# Patient Record
Sex: FEMALE | Race: WHITE | NOT HISPANIC OR LATINO | URBAN - METROPOLITAN AREA
[De-identification: names, ages, dates, MRNs, and addresses within clinical notes are randomized per-mention and may not be internally consistent; named-entity substitution may affect disease eponyms.]

---

## 2020-09-29 VITALS — RESPIRATION RATE: 12 BRPM | WEIGHT: 178 LBS | HEIGHT: 66 IN | TEMPERATURE: 98 F

## 2020-09-30 ENCOUNTER — OFFICE (OUTPATIENT)
Dept: URBAN - METROPOLITAN AREA CLINIC 75 | Facility: CLINIC | Age: 35
End: 2020-09-30

## 2020-09-30 DIAGNOSIS — R10.11 RIGHT UPPER QUADRANT PAIN: ICD-10-CM

## 2020-09-30 PROCEDURE — 99204 OFFICE O/P NEW MOD 45 MIN: CPT | Performed by: INTERNAL MEDICINE

## 2020-09-30 RX ORDER — HYOSCYAMINE SULFATE 0.12 MG/1
TABLET ORAL
Qty: 60 | Refills: 10 | Status: COMPLETED
Start: 2020-09-30 | End: 2020-11-30

## 2020-11-24 VITALS
SYSTOLIC BLOOD PRESSURE: 145 MMHG | TEMPERATURE: 97 F | OXYGEN SATURATION: 99 % | SYSTOLIC BLOOD PRESSURE: 151 MMHG | SYSTOLIC BLOOD PRESSURE: 125 MMHG | SYSTOLIC BLOOD PRESSURE: 160 MMHG | DIASTOLIC BLOOD PRESSURE: 91 MMHG | HEART RATE: 100 BPM | HEART RATE: 101 BPM | DIASTOLIC BLOOD PRESSURE: 65 MMHG | HEART RATE: 112 BPM | HEART RATE: 92 BPM | HEART RATE: 98 BPM | DIASTOLIC BLOOD PRESSURE: 63 MMHG | RESPIRATION RATE: 14 BRPM | DIASTOLIC BLOOD PRESSURE: 100 MMHG | SYSTOLIC BLOOD PRESSURE: 165 MMHG | DIASTOLIC BLOOD PRESSURE: 79 MMHG | RESPIRATION RATE: 16 BRPM | HEART RATE: 104 BPM | RESPIRATION RATE: 15 BRPM | OXYGEN SATURATION: 98 % | HEART RATE: 96 BPM | DIASTOLIC BLOOD PRESSURE: 86 MMHG | DIASTOLIC BLOOD PRESSURE: 85 MMHG | TEMPERATURE: 97.4 F | RESPIRATION RATE: 9 BRPM | DIASTOLIC BLOOD PRESSURE: 98 MMHG | RESPIRATION RATE: 22 BRPM | OXYGEN SATURATION: 100 % | HEART RATE: 95 BPM | WEIGHT: 175 LBS | SYSTOLIC BLOOD PRESSURE: 166 MMHG | RESPIRATION RATE: 10 BRPM | HEART RATE: 93 BPM | SYSTOLIC BLOOD PRESSURE: 150 MMHG | SYSTOLIC BLOOD PRESSURE: 136 MMHG | DIASTOLIC BLOOD PRESSURE: 83 MMHG | SYSTOLIC BLOOD PRESSURE: 124 MMHG | SYSTOLIC BLOOD PRESSURE: 143 MMHG | HEART RATE: 97 BPM | DIASTOLIC BLOOD PRESSURE: 69 MMHG | HEIGHT: 66 IN

## 2020-11-30 ENCOUNTER — OFFICE (OUTPATIENT)
Dept: URBAN - METROPOLITAN AREA PATHOLOGY 4 | Facility: PATHOLOGY | Age: 35
End: 2020-11-30
Payer: COMMERCIAL

## 2020-11-30 ENCOUNTER — AMBULATORY SURGICAL CENTER (OUTPATIENT)
Dept: URBAN - METROPOLITAN AREA SURGERY 17 | Facility: SURGERY | Age: 35
End: 2020-11-30
Payer: COMMERCIAL

## 2020-11-30 DIAGNOSIS — R10.11 RIGHT UPPER QUADRANT PAIN: ICD-10-CM

## 2020-11-30 DIAGNOSIS — K31.89 OTHER DISEASES OF STOMACH AND DUODENUM: ICD-10-CM

## 2020-11-30 LAB
GI HISTOLOGY: A. SELECT: (no result)
GI HISTOLOGY: B. SELECT: (no result)
GI HISTOLOGY: PDF REPORT: (no result)

## 2020-11-30 PROCEDURE — 88305 TISSUE EXAM BY PATHOLOGIST: CPT | Performed by: INTERNAL MEDICINE

## 2020-11-30 PROCEDURE — 43239 EGD BIOPSY SINGLE/MULTIPLE: CPT | Performed by: INTERNAL MEDICINE

## 2020-11-30 PROCEDURE — 45378 DIAGNOSTIC COLONOSCOPY: CPT | Performed by: INTERNAL MEDICINE

## 2021-01-04 VITALS — WEIGHT: 176 LBS | HEIGHT: 66 IN

## 2021-01-06 ENCOUNTER — OFFICE (OUTPATIENT)
Dept: URBAN - METROPOLITAN AREA CLINIC 75 | Facility: CLINIC | Age: 36
End: 2021-01-06

## 2021-01-06 DIAGNOSIS — R10.11 RIGHT UPPER QUADRANT PAIN: ICD-10-CM

## 2021-01-06 DIAGNOSIS — E80.20 UNSPECIFIED PORPHYRIA: ICD-10-CM

## 2021-01-06 DIAGNOSIS — R11.2 NAUSEA WITH VOMITING, UNSPECIFIED: ICD-10-CM

## 2021-01-06 DIAGNOSIS — K83.8 OTHER SPECIFIED DISEASES OF BILIARY TRACT: ICD-10-CM

## 2021-01-06 PROCEDURE — 99213 OFFICE O/P EST LOW 20 MIN: CPT | Mod: 95 | Performed by: INTERNAL MEDICINE

## 2021-08-12 ENCOUNTER — OFFICE (OUTPATIENT)
Dept: URBAN - METROPOLITAN AREA CLINIC 75 | Facility: CLINIC | Age: 36
End: 2021-08-12

## 2021-08-12 VITALS — SYSTOLIC BLOOD PRESSURE: 126 MMHG | DIASTOLIC BLOOD PRESSURE: 84 MMHG | HEIGHT: 66 IN | WEIGHT: 171 LBS

## 2021-08-12 DIAGNOSIS — R10.11 RIGHT UPPER QUADRANT PAIN: ICD-10-CM

## 2021-08-12 DIAGNOSIS — R11.2 NAUSEA WITH VOMITING, UNSPECIFIED: ICD-10-CM

## 2021-08-12 PROCEDURE — 99214 OFFICE O/P EST MOD 30 MIN: CPT | Performed by: NURSE PRACTITIONER

## 2022-02-10 ENCOUNTER — OFFICE VISIT (OUTPATIENT)
Dept: NEUROSURGERY | Facility: CLINIC | Age: 37
End: 2022-02-10

## 2022-02-10 VITALS — WEIGHT: 166 LBS | HEIGHT: 66 IN | BODY MASS INDEX: 26.68 KG/M2

## 2022-02-10 DIAGNOSIS — M54.14 THORACIC RADICULOPATHY: ICD-10-CM

## 2022-02-10 DIAGNOSIS — S22.080D COMPRESSION FRACTURE OF T11 VERTEBRA WITH ROUTINE HEALING, SUBSEQUENT ENCOUNTER: ICD-10-CM

## 2022-02-10 DIAGNOSIS — M51.34 DEGENERATIVE DISC DISEASE, THORACIC: Primary | ICD-10-CM

## 2022-02-10 DIAGNOSIS — M62.838 MUSCLE SPASM: ICD-10-CM

## 2022-02-10 PROCEDURE — 99204 OFFICE O/P NEW MOD 45 MIN: CPT | Performed by: NURSE PRACTITIONER

## 2022-02-10 RX ORDER — NORETHINDRONE ACETATE AND ETHINYL ESTRADIOL, AND FERROUS FUMARATE 1MG-20(24)
1 KIT ORAL DAILY
COMMUNITY
Start: 2022-02-09

## 2022-02-10 RX ORDER — PROMETHAZINE HYDROCHLORIDE 25 MG/1
TABLET ORAL
COMMUNITY
Start: 2021-11-16

## 2022-02-10 RX ORDER — TRAMADOL HYDROCHLORIDE 50 MG/1
TABLET ORAL
COMMUNITY
Start: 2022-01-13

## 2022-02-10 RX ORDER — BACLOFEN 10 MG/1
10 TABLET ORAL 2 TIMES DAILY PRN
Qty: 60 TABLET | Refills: 1 | Status: SHIPPED | OUTPATIENT
Start: 2022-02-10

## 2022-02-10 RX ORDER — AMLODIPINE BESYLATE 5 MG/1
TABLET ORAL
COMMUNITY
Start: 2022-01-29

## 2022-02-10 NOTE — PATIENT INSTRUCTIONS
-Baclofen 10 mg twice daily as needed for muscle spasms  -Pain management for thoracic epidural injections  -Follow up as needed

## 2022-02-10 NOTE — PROGRESS NOTES
"Chief Complaint  Back Pain    Subjective          Samuel Salmeron who is a 36 y.o. year old female who presents to [facility] for evaluation of back pain.     Patient presenting with concerns of mid back pain, starting several years ago, at least 10 years ago. Pain is primarily on the right side in the rib territory. Pt rates pain 5/10. Will increase to severe at times. When the pain hits her she will have to crawl into a ball. Described as stabbing and sharp. Pain does radiate into the mid thoracic distribution. Pt denies weakness. Pt denies problems with bowel and bladder.     She has been evaluated extensively with GI, CT scans and prior gallbladder surgery and spincterectomy. Has had kidney ultrasound and function.     Recent Interventions for Pain: Medication Management and Pain management which was not very effective. She had nerve blocks that did not provide much benefit. She is taking Tramadol which provides her the most benefit.     Prior Surgery: no              Review of Systems   Musculoskeletal: Positive for arthralgias and back pain.   Neurological: Positive for numbness.   All other systems reviewed and are negative.       Objective   Vital Signs:   Ht 167.6 cm (66\")   Wt 75.3 kg (166 lb)   BMI 26.79 kg/m²       Physical Exam  Vitals reviewed.   Constitutional:       Appearance: Normal appearance.   Musculoskeletal:      Thoracic back: Tenderness present.   Neurological:      Mental Status: She is alert and oriented to person, place, and time.      Gait: Gait is intact.      Deep Tendon Reflexes: Strength normal.      Reflex Scores:       Patellar reflexes are 2+ on the right side and 2+ on the left side.       Achilles reflexes are 1+ on the right side and 1+ on the left side.       Neurologic Exam     Mental Status   Oriented to person, place, and time.   Level of consciousness: alert    Motor Exam   Muscle bulk: normal  Overall muscle tone: " normal    Strength   Strength 5/5 throughout.     Sensory Exam   Light touch normal.     Gait, Coordination, and Reflexes     Gait  Gait: normal    Reflexes   Right patellar: 2+  Left patellar: 2+  Right achilles: 1+  Left achilles: 1+  Right ankle clonus: absent  Left ankle clonus: absent       Result Review :       Data reviewed: Radiologic studies MRI Thoracic Spine at [facility] on 12/21/21 reviewed. Chronic mild T11 compression deformities. Multilevel degenerative changes without focal disc protrusions at several levels. No canal or foraminal stenosis. No cord signal change.           Assessment and Plan    Diagnoses and all orders for this visit:    1. Degenerative disc disease, thoracic (Primary)  -     Ambulatory Referral to Pain Management    2. Compression fracture of T11 vertebra with routine healing, subsequent encounter    3. Thoracic radiculopathy  -     Ambulatory Referral to Pain Management    4. Muscle spasm  -     baclofen (LIORESAL) 10 MG tablet; Take 1 tablet by mouth 2 (Two) Times a Day As Needed for Muscle Spasms.  Dispense: 60 tablet; Refill: 1    Pt presenting with chronic mid back pain with right sided pain. We reviewed her MRI Thoracic Spine, revealing multilevel degenerative changes with a mild T11 compression deformity. No surgical recommendations at this time. Will refer to pain management for TESI. We discussed the benefits of core strengthening, routine stretching for chronic back pain. She will consider physical therapy in the future. Will start baclofen as needed for muscle spasms. She can follow up in our office as needed.       Follow Up   Return if symptoms worsen or fail to improve.  Patient was given instructions and counseling regarding her condition or for health maintenance advice.     -Baclofen 10 mg twice daily as needed for muscle spasms  -Pain management for thoracic epidural injections  -Follow up as needed

## 2022-02-21 ENCOUNTER — TELEPHONE (OUTPATIENT)
Dept: NEUROSURGERY | Facility: CLINIC | Age: 37
End: 2022-02-21

## 2022-02-21 NOTE — TELEPHONE ENCOUNTER
PATIENT WAS TOLD TO CALL THE OFFICE IF SHE HAD NOT HEARD FROM [facility].  PLEASE CALL PATIENT AND ADVISE ON WHAT SHE SHOULD DO OR REFAX THE REFERRAL    [phone]

## 2023-05-03 ENCOUNTER — OFFICE VISIT (OUTPATIENT)
Dept: NEUROSURGERY | Facility: CLINIC | Age: 38
End: 2023-05-03
Payer: COMMERCIAL

## 2023-05-03 VITALS
HEIGHT: 66 IN | OXYGEN SATURATION: 99 % | SYSTOLIC BLOOD PRESSURE: 126 MMHG | HEART RATE: 94 BPM | WEIGHT: 155 LBS | DIASTOLIC BLOOD PRESSURE: 84 MMHG | BODY MASS INDEX: 24.91 KG/M2

## 2023-05-03 DIAGNOSIS — M54.14 THORACIC RADICULOPATHY: Primary | ICD-10-CM

## 2023-05-03 DIAGNOSIS — G58.8 INTERCOSTAL NEURALGIA: ICD-10-CM

## 2023-05-03 DIAGNOSIS — M51.36 DDD (DEGENERATIVE DISC DISEASE), LUMBAR: ICD-10-CM

## 2023-05-03 PROCEDURE — 99244 OFF/OP CNSLTJ NEW/EST MOD 40: CPT | Performed by: NEUROLOGICAL SURGERY

## 2023-05-03 NOTE — PROGRESS NOTES
Subjective   Patient ID: Samuel Salmeron is a 37 y.o. female is here today for follow-up.    This very nice lady is sent by both her primary care team and her pain physician Dr. Cline.  She has a long history of lower right sided chest wall pain.  She has some nonspecific low back pain from degenerative disc disease in the lumbar spine but does not her main problem.  It started in 2011 and initially was worked up as a gastrointestinal problem.  She even had her gallbladder removed early on and it made no difference.  The pain has been treated along the way with neuropathic pain medication such as Lyrica and gabapentin, none of which have worked.  Its persisted all these years in the last year or so she has been working with the pain clinic at [facility] and she has had spinal injections as well as apparently intercostal nerve injections.  She had a cyst removed in that area which had no effect on her pain.  She gets tramadol from [facility].  She continues to work but the pain happens fairly frequently intermittently but at a regular basis.  Occasionally interferes with sleep.  She is here for my opinion.  I told her I thought that the underlying diagnosis is probably intercostal neuralgia of unclear etiology.  She certainly does describe some nerve pain.  I think it important issues to see whether or not it is related to some thoracic root compression which oftentimes is difficult to see even on an MRI so I recommended that she have a thoracolumbar myelogram to see if there is any lower thoracic or upper lumbar root compression that could account for this.  If there is and there is potentially a surgical treatment for it.  I am not highly suspicious that we will find that but if we do not then spinal cord stimulation which is already been brought up by Dr. Cline is a reasonable option for her assuming she feels that this is interfered significantly enough with her quality of life.  She has no motor  "deficits and no signs of myelopathy.  We will discuss the myelogram results when she comes back.        History of Present Illness    The following portions of the patient's history were reviewed and updated as appropriate: allergies, current medications, past family history, past medical history, past social history, past surgical history and problem list.    Review of Systems   Constitutional: Negative for fever.   All other systems reviewed and are negative.          Objective     Vitals:    05/03/23 0838   BP: 126/84   Pulse: 94   SpO2: 99%   Weight: 70.3 kg (155 lb)   Height: 167.6 cm (66\")     Body mass index is 25.02 kg/m².    Tobacco Use: Low Risk    • Smoking Tobacco Use: Never   • Smokeless Tobacco Use: Never   • Passive Exposure: Not on file          Physical Exam  Constitutional:       Appearance: She is well-developed.   HENT:      Head: Normocephalic and atraumatic.   Eyes:      Extraocular Movements: EOM normal.      Conjunctiva/sclera: Conjunctivae normal.      Pupils: Pupils are equal, round, and reactive to light.   Neck:      Vascular: No carotid bruit.   Neurological:      Mental Status: She is oriented to person, place, and time.      Coordination: Finger-Nose-Finger Test and Heel to Shin Test normal.      Gait: Gait is intact.      Deep Tendon Reflexes:      Reflex Scores:       Tricep reflexes are 2+ on the right side and 2+ on the left side.       Bicep reflexes are 2+ on the right side and 2+ on the left side.       Brachioradialis reflexes are 2+ on the right side and 2+ on the left side.       Patellar reflexes are 2+ on the right side and 2+ on the left side.       Achilles reflexes are 2+ on the right side and 2+ on the left side.  Psychiatric:         Speech: Speech normal.       Neurologic Exam     Mental Status   Oriented to person, place, and time.   Registration of memory: Good recent and remote memory.   Attention: normal. Concentration: normal.   Speech: speech is normal   Level " of consciousness: alert  Knowledge: consistent with education.     Cranial Nerves     CN II   Visual fields full to confrontation.   Visual acuity: normal    CN III, IV, VI   Pupils are equal, round, and reactive to light.  Extraocular motions are normal.     CN V   Facial sensation intact.   Right corneal reflex: normal  Left corneal reflex: normal    CN VII   Facial expression full, symmetric.   Right facial weakness: none  Left facial weakness: none    CN VIII   Hearing: intact    CN IX, X   Palate: symmetric    CN XI   Right sternocleidomastoid strength: normal  Left sternocleidomastoid strength: normal    CN XII   Tongue: not atrophic  Tongue deviation: none    Motor Exam   Muscle bulk: normal  Right arm tone: normal  Left arm tone: normal  Right leg tone: normal  Left leg tone: normal    Strength   Strength 5/5 except as noted.     Sensory Exam   Light touch normal.     Gait, Coordination, and Reflexes     Gait  Gait: normal    Coordination   Finger to nose coordination: normal  Heel to shin coordination: normal    Reflexes   Right brachioradialis: 2+  Left brachioradialis: 2+  Right biceps: 2+  Left biceps: 2+  Right triceps: 2+  Left triceps: 2+  Right patellar: 2+  Left patellar: 2+  Right achilles: 2+  Left achilles: 2+  Right : 2+  Left : 2+          Assessment & Plan   Independent Review of Radiographic Studies:      I personally reviewed the images from the following studies.    I reviewed the lumbar MRI done in October 2022 which shows some mild degenerative disc disease and mild disc space collapse at L5-S1 but no obvious root compression.  The thoracic MRI done at an outside institution does show a lot of facet disease and disc degeneration, no obvious cord or root compression.  Agree with the reports.      Medical Decision Making:      As per the above discussion we will move forward with a thoracolumbar myelogram looking specifically for either lower thoracic or upper lumbar nerve root  compression.  She is aware of the risk of a spinal headache and the potential need for blood patch.  We will discuss it when she comes back.  If there is no obvious spinal compression in the thoracic or lumbar spine then a spinal cord stimulator would be a reasonable thing assuming she feels that her pain syndrome is severe enough to justify it.        Diagnoses and all orders for this visit:    1. Thoracic radiculopathy (Primary)  -     IR Myelogram 2 or More Areas; Future  -     Ct Thoracic Spine With Intrathecal Contrast; Future  -     CT Lumbar Spine With Intrathecal Contrast; Future    2. DDD (degenerative disc disease), lumbar  -     IR Myelogram 2 or More Areas; Future  -     Ct Thoracic Spine With Intrathecal Contrast; Future  -     CT Lumbar Spine With Intrathecal Contrast; Future    3. Intercostal neuralgia  -     IR Myelogram 2 or More Areas; Future  -     Ct Thoracic Spine With Intrathecal Contrast; Future  -     CT Lumbar Spine With Intrathecal Contrast; Future    Other orders  -     No Lab Testing Needed; Standing      Return in about 4 weeks (around 5/31/2023) for After myelogram is done, face-to-face.

## 2023-05-03 NOTE — LETTER
May 3, 2023       No Recipients    Patient: Samuel Salmeron   YOB: 1985   Date of Visit: 5/3/2023       Dear Dr. Pritchard Recipients:    Thank you for referring Samuel Salmeron to me for evaluation. Below are the relevant portions of my assessment and plan of care.    If you have questions, please do not hesitate to call me. I look forward to following Samuel along with you.         Sincerely,        Marvin Marcelo MD        CC:   No Recipients    Marvin Marcelo MD  05/03/23 0924  Signed  Subjective    Patient ID: Samuel Salmeron is a 37 y.o. female is here today for follow-up.    This very nice lady is sent by both her primary care team and her pain physician Dr. Cline.  She has a long history of lower right sided chest wall pain.  She has some nonspecific low back pain from degenerative disc disease in the lumbar spine but does not her main problem.  It started in 2011 and initially was worked up as a gastrointestinal problem.  She even had her gallbladder removed early on and it made no difference.  The pain has been treated along the way with neuropathic pain medication such as Lyrica and gabapentin, none of which have worked.  Its persisted all these years in the last year or so she has been working with the pain clinic at [facility] and she has had spinal injections as well as apparently intercostal nerve injections.  She had a cyst removed in that area which had no effect on her pain.  She gets tramadol from [facility].  She continues to work but the pain happens fairly frequently intermittently but at a regular basis.  Occasionally interferes with sleep.  She is here for my opinion.  I told her I thought that the underlying diagnosis is probably intercostal neuralgia of unclear etiology.  She certainly does describe some nerve pain.  I think it important issues to see whether or not it is related to some thoracic root compression which oftentimes is difficult to see even on  "an MRI so I recommended that she have a thoracolumbar myelogram to see if there is any lower thoracic or upper lumbar root compression that could account for this.  If there is and there is potentially a surgical treatment for it.  I am not highly suspicious that we will find that but if we do not then spinal cord stimulation which is already been brought up by Dr. Cline is a reasonable option for her assuming she feels that this is interfered significantly enough with her quality of life.  She has no motor deficits and no signs of myelopathy.  We will discuss the myelogram results when she comes back.        History of Present Illness    The following portions of the patient's history were reviewed and updated as appropriate: allergies, current medications, past family history, past medical history, past social history, past surgical history and problem list.    Review of Systems   Constitutional: Negative for fever.   All other systems reviewed and are negative.          Objective      Vitals:    05/03/23 0838   BP: 126/84   Pulse: 94   SpO2: 99%   Weight: 70.3 kg (155 lb)   Height: 167.6 cm (66\")     Body mass index is 25.02 kg/m².    Tobacco Use: Low Risk    • Smoking Tobacco Use: Never   • Smokeless Tobacco Use: Never   • Passive Exposure: Not on file          Physical Exam  Constitutional:       Appearance: She is well-developed.   HENT:      Head: Normocephalic and atraumatic.   Eyes:      Extraocular Movements: EOM normal.      Conjunctiva/sclera: Conjunctivae normal.      Pupils: Pupils are equal, round, and reactive to light.   Neck:      Vascular: No carotid bruit.   Neurological:      Mental Status: She is oriented to person, place, and time.      Coordination: Finger-Nose-Finger Test and Heel to Shin Test normal.      Gait: Gait is intact.      Deep Tendon Reflexes:      Reflex Scores:       Tricep reflexes are 2+ on the right side and 2+ on the left side.       Bicep reflexes are 2+ on the right " side and 2+ on the left side.       Brachioradialis reflexes are 2+ on the right side and 2+ on the left side.       Patellar reflexes are 2+ on the right side and 2+ on the left side.       Achilles reflexes are 2+ on the right side and 2+ on the left side.  Psychiatric:         Speech: Speech normal.       Neurologic Exam     Mental Status   Oriented to person, place, and time.   Registration of memory: Good recent and remote memory.   Attention: normal. Concentration: normal.   Speech: speech is normal   Level of consciousness: alert  Knowledge: consistent with education.     Cranial Nerves     CN II   Visual fields full to confrontation.   Visual acuity: normal    CN III, IV, VI   Pupils are equal, round, and reactive to light.  Extraocular motions are normal.     CN V   Facial sensation intact.   Right corneal reflex: normal  Left corneal reflex: normal    CN VII   Facial expression full, symmetric.   Right facial weakness: none  Left facial weakness: none    CN VIII   Hearing: intact    CN IX, X   Palate: symmetric    CN XI   Right sternocleidomastoid strength: normal  Left sternocleidomastoid strength: normal    CN XII   Tongue: not atrophic  Tongue deviation: none    Motor Exam   Muscle bulk: normal  Right arm tone: normal  Left arm tone: normal  Right leg tone: normal  Left leg tone: normal    Strength   Strength 5/5 except as noted.     Sensory Exam   Light touch normal.     Gait, Coordination, and Reflexes     Gait  Gait: normal    Coordination   Finger to nose coordination: normal  Heel to shin coordination: normal    Reflexes   Right brachioradialis: 2+  Left brachioradialis: 2+  Right biceps: 2+  Left biceps: 2+  Right triceps: 2+  Left triceps: 2+  Right patellar: 2+  Left patellar: 2+  Right achilles: 2+  Left achilles: 2+  Right : 2+  Left : 2+          Assessment & Plan   Independent Review of Radiographic Studies:      I personally reviewed the images from the following studies.    I  reviewed the lumbar MRI done in October 2022 which shows some mild degenerative disc disease and mild disc space collapse at L5-S1 but no obvious root compression.  The thoracic MRI done at an outside institution does show a lot of facet disease and disc degeneration, no obvious cord or root compression.  Agree with the reports.      Medical Decision Making:      As per the above discussion we will move forward with a thoracolumbar myelogram looking specifically for either lower thoracic or upper lumbar nerve root compression.  She is aware of the risk of a spinal headache and the potential need for blood patch.  We will discuss it when she comes back.  If there is no obvious spinal compression in the thoracic or lumbar spine then a spinal cord stimulator would be a reasonable thing assuming she feels that her pain syndrome is severe enough to justify it.        Diagnoses and all orders for this visit:    1. Thoracic radiculopathy (Primary)  -     IR Myelogram 2 or More Areas; Future  -     Ct Thoracic Spine With Intrathecal Contrast; Future  -     CT Lumbar Spine With Intrathecal Contrast; Future    2. DDD (degenerative disc disease), lumbar  -     IR Myelogram 2 or More Areas; Future  -     Ct Thoracic Spine With Intrathecal Contrast; Future  -     CT Lumbar Spine With Intrathecal Contrast; Future    3. Intercostal neuralgia  -     IR Myelogram 2 or More Areas; Future  -     Ct Thoracic Spine With Intrathecal Contrast; Future  -     CT Lumbar Spine With Intrathecal Contrast; Future    Other orders  -     No Lab Testing Needed; Standing      Return in about 4 weeks (around 5/31/2023) for After myelogram is done, face-to-face.

## 2023-06-09 ENCOUNTER — HOSPITAL ENCOUNTER (OUTPATIENT)
Dept: CT IMAGING | Facility: HOSPITAL | Age: 38
Discharge: HOME | End: 2023-06-09
Payer: COMMERCIAL

## 2023-06-09 ENCOUNTER — HOSPITAL ENCOUNTER (OUTPATIENT)
Dept: GENERAL RADIOLOGY | Facility: HOSPITAL | Age: 38
Discharge: HOME | End: 2023-06-09
Payer: COMMERCIAL

## 2023-06-09 VITALS
OXYGEN SATURATION: 100 % | HEART RATE: 67 BPM | HEIGHT: 66 IN | BODY MASS INDEX: 24.91 KG/M2 | TEMPERATURE: 97.8 F | SYSTOLIC BLOOD PRESSURE: 120 MMHG | WEIGHT: 155 LBS | RESPIRATION RATE: 18 BRPM | DIASTOLIC BLOOD PRESSURE: 86 MMHG

## 2023-06-09 DIAGNOSIS — M51.36 DDD (DEGENERATIVE DISC DISEASE), LUMBAR: ICD-10-CM

## 2023-06-09 DIAGNOSIS — M54.14 THORACIC RADICULOPATHY: ICD-10-CM

## 2023-06-09 DIAGNOSIS — G58.8 INTERCOSTAL NEURALGIA: ICD-10-CM

## 2023-06-09 PROCEDURE — 25510000001 IOPAMIDOL 61 % SOLUTION: Performed by: RADIOLOGY

## 2023-06-09 PROCEDURE — 0 LIDOCAINE 1 % SOLUTION: Performed by: RADIOLOGY

## 2023-06-09 PROCEDURE — 72129 CT CHEST SPINE W/DYE: CPT

## 2023-06-09 PROCEDURE — 72240 MYELOGRAPHY NECK SPINE: CPT

## 2023-06-09 PROCEDURE — 62305 MYELOGRAPHY LUMBAR INJECTION: CPT

## 2023-06-09 PROCEDURE — 72132 CT LUMBAR SPINE W/DYE: CPT

## 2023-06-09 RX ORDER — LIDOCAINE HYDROCHLORIDE 10 MG/ML
10 INJECTION, SOLUTION INFILTRATION; PERINEURAL ONCE
Status: COMPLETED | OUTPATIENT
Start: 2023-06-09 | End: 2023-06-09

## 2023-06-09 RX ORDER — BACLOFEN 20 MG/1
20 TABLET ORAL
COMMUNITY

## 2023-06-09 RX ORDER — TIZANIDINE 4 MG/1
TABLET ORAL
COMMUNITY
Start: 2023-05-18

## 2023-06-09 RX ORDER — SODIUM CHLORIDE 0.9 % (FLUSH) 0.9 %
10 SYRINGE (ML) INJECTION AS NEEDED
Status: DISCONTINUED | OUTPATIENT
Start: 2023-06-09 | End: 2023-06-10 | Stop reason: HOSPADM

## 2023-06-09 RX ORDER — ALPRAZOLAM 0.5 MG/1
0.5 TABLET ORAL ONCE
Status: COMPLETED | OUTPATIENT
Start: 2023-06-09 | End: 2023-06-09

## 2023-06-09 RX ADMIN — IOPAMIDOL 10 ML: 612 INJECTION, SOLUTION INTRATHECAL at 09:41

## 2023-06-09 RX ADMIN — LIDOCAINE HYDROCHLORIDE 5 ML: 10 INJECTION, SOLUTION INFILTRATION; PERINEURAL at 09:40

## 2023-06-09 RX ADMIN — ALPRAZOLAM 0.5 MG: 0.5 TABLET ORAL at 08:34

## 2023-06-09 NOTE — NURSING NOTE
No Signs/Symptoms of distress noted. Patient taken by wheelchair to Patient Discharge to meet their ride.

## 2023-06-09 NOTE — DISCHARGE INSTRUCTIONS
EDUCATION /DISCHARGE INSTRUCTIONS:  A myelogram is a special radiology procedure of the spinal cord, spinal nerves and other related structures.  You will be awake during the examination.  An area of your lower back will be cleansed with an antiseptic solution.  The physician will inject a numbing medication in your lower back.  While your back is numb, a needle will be placed in the lower back area.  A small amount of spinal fluid may be withdrawn and sent to the lab if ordered by your physician. While the needle is in the back, an injection of a contrast material (xray dye) will be given through the needle.  The contrast material will allow the physician to see the spinal cord and spinal nerves.  Once injected, the needle will be removed and a band aid will be placed over the injection site.  The table will be tilted during the process to allow the contrast material to flow to particular areas in the spine.  Following the injection and xrays, you will be taken to the CT scan where more pictures will be taken. After the procedure is finished, the contrast material will be absorbed by your body and eliminated through your kidneys.  The radiologist will study and interpret your myelogram and send the results to your physician.  Procedure risks of a myelogram include, but are not limited to:  *  Bleeding   *  Seizure  *  Infection   *  Headache, possibly severe requiring a blood patch  *  Contrast reaction  *  Nerve or cord injury  *  Paralysis and death    Benefits of the procedure:    Best examination for delineating pathology related to spinal cord compression from a disc and/or nerve root compression  Alternatives to the procedure:MRI - a non invasive procedure requiring intravenous contrast injection. Cannot be done on patients with certain pacemakers or metal in the body.  MRI risks include possible reaction to the contrast material, movement of metal located in the body.   Benefit to MRI:  Non-invasive and  usually painless procedure.  THIS EDUCATION INFORMATION WAS REVIEWED PRIOR TO THE PROCEDURE AND CONSENT.   Patient initials __________________Time_________________      Important information following your myelogram:    *  When you get home, lie down with no more than 2 pillows under your head.  *  Sit up in the car going home. At home, sit up to eat and use the restroom only, then lie back down.   *  24 HOURS COMPLETE AT _________10:00am 6/10/23 Saturday_______________   *  Tomorrow, after 24 hours completed, take it easy and rest the next 2-3 days.  *  Do not drive for 24 hours following a myelogram  *  You may remove the bandage and shower in the morning  *  Increase your fluids for the next 24 hours.  Caffeinated drinks are encouraged.Increase your intake of fluids the next 2-3 days    CALL YOUR PHYSICIAN FOR THE FOLLOWING:  * Pain at the injection site  * Redness, swelling, bruising or drainage at the injection site  * A fever by mouth of 101.0  * Any new symptoms  If you have problems with a headache that is not relieved with rest and medication, please call the Radiology Triage Nurse desk [phone]

## 2023-06-09 NOTE — H&P
Name: Samuel Salmeron ADMIT: 2023   : 1985  PCP: Yaquelin Howard APRN    MRN: [id number] LOS: 0 days   AGE/SEX: 37 y.o. female  ROOM: Room/bed info not found     Chief complaint   Patient is a 37 y.o. female presents with lower right chest wall pain.     History of Present Illness: lower right chest wall pain    Past Surgical History:  Past Surgical History:   Procedure Laterality Date     SECTION      CHOLECYSTECTOMY      CYST REMOVAL Right 2022    Under breast    KNEE SURGERY Left     Wood shard in bloodstream removed    SKIN CANCER EXCISION N/A     2022 back       Past Medical History:  Past Medical History:   Diagnosis Date    Hypertension        Home Medications:  (Not in a hospital admission)      Allergies:  Gabapentin    Family History:  No family history on file.    Social History:  Social History     Tobacco Use    Smoking status: Never    Smokeless tobacco: Never        Objective     Physical Exam:   Mental Status: alert/oriented   Heart: regular   Lungs: clear       Vital Signs  Temp:  [97.8 °F (36.6 °C)] 97.8 °F (36.6 °C)  Heart Rate:  [79] 79  Resp:  [18] 18  BP: (130)/(87) 130/87    Anticipated Surgical Procedure:  myelogram    The risks, benefits and alternatives of this procedure have been discussed with the patient or responsible party: Yes      Navarro Valle MD  23  08:22 EDT

## 2023-06-10 ENCOUNTER — TELEPHONE (OUTPATIENT)
Dept: INTERVENTIONAL RADIOLOGY/VASCULAR | Facility: HOSPITAL | Age: 38
End: 2023-06-10
Payer: COMMERCIAL

## 2023-06-10 ENCOUNTER — HOSPITAL ENCOUNTER (EMERGENCY)
Facility: HOSPITAL | Age: 38
Discharge: HOME | End: 2023-06-10
Attending: EMERGENCY MEDICINE
Payer: COMMERCIAL

## 2023-06-10 VITALS
SYSTOLIC BLOOD PRESSURE: 120 MMHG | WEIGHT: 155 LBS | HEART RATE: 77 BPM | OXYGEN SATURATION: 98 % | BODY MASS INDEX: 24.91 KG/M2 | RESPIRATION RATE: 18 BRPM | DIASTOLIC BLOOD PRESSURE: 87 MMHG | HEIGHT: 66 IN | TEMPERATURE: 98.6 F

## 2023-06-10 DIAGNOSIS — R11.2 NAUSEA AND VOMITING, UNSPECIFIED VOMITING TYPE: ICD-10-CM

## 2023-06-10 DIAGNOSIS — R51.9 ACUTE NONINTRACTABLE HEADACHE, UNSPECIFIED HEADACHE TYPE: Primary | ICD-10-CM

## 2023-06-10 PROCEDURE — 99283 EMERGENCY DEPT VISIT LOW MDM: CPT

## 2023-06-10 PROCEDURE — 96374 THER/PROPH/DIAG INJ IV PUSH: CPT

## 2023-06-10 PROCEDURE — 96375 TX/PRO/DX INJ NEW DRUG ADDON: CPT

## 2023-06-10 PROCEDURE — 25010000002 KETOROLAC TROMETHAMINE PER 15 MG: Performed by: EMERGENCY MEDICINE

## 2023-06-10 PROCEDURE — 25010000002 DIPHENHYDRAMINE PER 50 MG: Performed by: EMERGENCY MEDICINE

## 2023-06-10 PROCEDURE — 25010000002 PROCHLORPERAZINE 10 MG/2ML SOLUTION: Performed by: EMERGENCY MEDICINE

## 2023-06-10 RX ORDER — SODIUM CHLORIDE 0.9 % (FLUSH) 0.9 %
10 SYRINGE (ML) INJECTION AS NEEDED
Status: DISCONTINUED | OUTPATIENT
Start: 2023-06-10 | End: 2023-06-10 | Stop reason: HOSPADM

## 2023-06-10 RX ORDER — DIPHENHYDRAMINE HYDROCHLORIDE 50 MG/ML
25 INJECTION INTRAMUSCULAR; INTRAVENOUS ONCE
Status: COMPLETED | OUTPATIENT
Start: 2023-06-10 | End: 2023-06-10

## 2023-06-10 RX ORDER — KETOROLAC TROMETHAMINE 15 MG/ML
15 INJECTION, SOLUTION INTRAMUSCULAR; INTRAVENOUS ONCE
Status: COMPLETED | OUTPATIENT
Start: 2023-06-10 | End: 2023-06-10

## 2023-06-10 RX ORDER — PROCHLORPERAZINE EDISYLATE 5 MG/ML
10 INJECTION INTRAMUSCULAR; INTRAVENOUS ONCE
Status: COMPLETED | OUTPATIENT
Start: 2023-06-10 | End: 2023-06-10

## 2023-06-10 RX ADMIN — PROCHLORPERAZINE EDISYLATE 10 MG: 5 INJECTION INTRAMUSCULAR; INTRAVENOUS at 18:20

## 2023-06-10 RX ADMIN — KETOROLAC TROMETHAMINE 15 MG: 15 INJECTION, SOLUTION INTRAMUSCULAR; INTRAVENOUS at 18:20

## 2023-06-10 RX ADMIN — DIPHENHYDRAMINE HYDROCHLORIDE 25 MG: 50 INJECTION, SOLUTION INTRAMUSCULAR; INTRAVENOUS at 18:20

## 2023-06-10 RX ADMIN — SODIUM CHLORIDE, POTASSIUM CHLORIDE, SODIUM LACTATE AND CALCIUM CHLORIDE 1000 ML: 600; 310; 30; 20 INJECTION, SOLUTION INTRAVENOUS at 18:12

## 2023-06-11 NOTE — ED PROVIDER NOTES
EMERGENCY DEPARTMENT ENCOUNTER    Room Number:  40/40  Date seen:  6/10/2023  PCP: Yaquelin Howard APRN  Historian(s): Patient and her       HPI:  Chief Complaint: Headache, nausea vomiting  A complete HPI/ROS/PMH/PSH/SH/FH are unobtainable / limited due to: None  Context: Samuel Salmeron is a 37 y.o. female who presents to the ED c/o headache with nausea and vomiting today.  Patient had a myelogram procedure performed yesterday.  She went home after the procedure and recovered well.  Today, she developed new onset headache that is atypical for her.  She says the pain is greatest at the top of the head.  The headache pain progressed and worsened to the point that she was having repeated episodes of nausea and vomiting.  She tried to lay down and rest but that did not make the headaches subside.  She denies any fevers or cough or cold or flulike symptoms.      PAST MEDICAL HISTORY  Active Ambulatory Problems     Diagnosis Date Noted    No Active Ambulatory Problems     Resolved Ambulatory Problems     Diagnosis Date Noted    No Resolved Ambulatory Problems     No Additional Past Medical History         PAST SURGICAL HISTORY  History reviewed. No pertinent surgical history.      FAMILY HISTORY  History reviewed. No pertinent family history.      SOCIAL HISTORY  Social History     Socioeconomic History    Marital status:          ALLERGIES  Patient has no known allergies.        REVIEW OF SYSTEMS  Review of Systems   Constitutional:  Negative for activity change and fever.   Eyes:  Negative for pain and visual disturbance.   Respiratory:  Negative for cough and shortness of breath.    Cardiovascular:  Negative for chest pain.   Gastrointestinal:  Positive for nausea and vomiting. Negative for abdominal pain.   Musculoskeletal:  Positive for back pain and myalgias.   Skin:  Negative for color change.   Neurological:  Positive for headaches. Negative for syncope.   All other systems reviewed  and are negative.         PHYSICAL EXAM  ED Triage Vitals [06/10/23 1657]   Temp Heart Rate Resp BP SpO2   98.6 °F (37 °C) 82 18 118/83 100 %      Temp src Heart Rate Source Patient Position BP Location FiO2 (%)   Tympanic Monitor Standing Right arm --       Physical Exam      GENERAL: Calm, no diaphoresis, lying back in bed no acute distress  HENT: nares patent, normocephalic and atraumatic  EYES: no scleral icterus, EOMI, normal conjunctiva  CV: regular rhythm, normal rate, normal distal pulses  RESPIRATORY: normal effort, no stridor is clear bilaterally  ABDOMEN: soft, nontender  MUSCULOSKELETAL: no deformity, no asymmetry  NEURO: alert, moves all extremities, follows commands, no motor deficits  PSYCH:  calm, cooperative  SKIN: warm, dry    Vital signs and nursing notes reviewed.        LAB RESULTS  No results found for this or any previous visit (from the past 24 hour(s)).    Ordered the above labs and reviewed the results.        RADIOLOGY  No Radiology Exams Resulted Within Past 24 Hours    Ordered the above noted radiological studies. Reviewed by me in PACS.          PROCEDURES  Procedures      MEDICATIONS GIVEN IN ER  Medications   sodium chloride 0.9 % flush 10 mL (has no administration in time range)   lactated ringers bolus 1,000 mL (1,000 mL Intravenous New Bag 6/10/23 1812)   prochlorperazine (COMPAZINE) injection 10 mg (10 mg Intravenous Given 6/10/23 1820)   diphenhydrAMINE (BENADRYL) injection 25 mg (25 mg Intravenous Given 6/10/23 1820)   ketorolac (TORADOL) injection 15 mg (15 mg Intravenous Given 6/10/23 1820)           MEDICAL DECISION MAKING, PROGRESS, and CONSULTS    All labs have been independently reviewed by me.  All radiology studies have been reviewed by me and I have also reviewed the radiology report.   EKG's independently viewed and interpreted by me.  Discussion below represents my analysis of pertinent findings related to patient's condition, differential diagnosis, treatment plan  and final disposition.      Additional sources:  - Discussed/ obtained information from independent historians: None    - External (non-ED) record review: I reviewed recent neurosurgery office visit from May 3, 2023 when patient had consultation for thoracic myelopathy.  At that time her myelogram study was ordered.      Orders placed during this visit:  Orders Placed This Encounter   Procedures    Insert Peripheral IV         Differential diagnosis includes but is not limited to:    Post LP headache, migraine headache, tension headache, dehydration      Independent interpretation of labs, radiology studies, and discussions with consultants:  ED Course as of 06/10/23 2031   Sat Jayson 10, 2023   2026 Patient resting much more comfortably now.  No nausea or vomiting throughout this visit.  Medications seem to have helped. [MAYRA]   2030 Patient sat up at the bedside just now during my reassessment.  She says she does not have a headache or nausea at this time.  I think she is safe to discharge home. [MAYRA]      ED Course User Index  [MAYRA] Bin Arroyo MD         DIAGNOSIS  Final diagnoses:   Acute nonintractable headache, unspecified headache type   Nausea and vomiting, unspecified vomiting type         DISPOSITION  Discharge home        Latest Documented Vital Signs:  As of 20:31 EDT  BP- 136/85 HR- 81 Temp- 98.6 °F (37 °C) (Tympanic) O2 sat- 99%              --    Please note that portions of this were completed with a voice recognition program.       Note Disclaimer: At [facility], we believe that sharing information builds trust and better relationships. You are receiving this note because you are receiving care at [facility] or recently visited. It is possible you will see health information before a provider has talked with you about it. This kind of information can be easy to misunderstand. To help you fully understand what it means for your health, we urge you to discuss this note with your  provider.             Bin Arroyo MD  06/10/23 2031

## 2023-06-11 NOTE — DISCHARGE INSTRUCTIONS
Rest as needed.  Drink plenty fluids as tolerated.  Please return to the emergency room for any worsening pain

## 2023-06-11 NOTE — TELEPHONE ENCOUNTER
Patient has headache when sitting up that gets better when lying. Advised to continue to lay flat, drink caffeine, and take OTC HA medication, and to call Dr. Marcelo's office if does not resolve.

## 2023-06-12 ENCOUNTER — ANESTHESIA (OUTPATIENT)
Dept: PAIN MEDICINE | Facility: HOSPITAL | Age: 38
End: 2023-06-12
Payer: COMMERCIAL

## 2023-06-12 ENCOUNTER — HOSPITAL ENCOUNTER (OUTPATIENT)
Dept: GENERAL RADIOLOGY | Facility: HOSPITAL | Age: 38
Discharge: HOME | End: 2023-06-12
Payer: COMMERCIAL

## 2023-06-12 ENCOUNTER — ANESTHESIA EVENT (OUTPATIENT)
Dept: PAIN MEDICINE | Facility: HOSPITAL | Age: 38
End: 2023-06-12
Payer: COMMERCIAL

## 2023-06-12 ENCOUNTER — HOSPITAL ENCOUNTER (OUTPATIENT)
Dept: PAIN MEDICINE | Facility: HOSPITAL | Age: 38
Discharge: HOME | End: 2023-06-12
Payer: COMMERCIAL

## 2023-06-12 ENCOUNTER — TELEPHONE (OUTPATIENT)
Dept: NEUROSURGERY | Facility: CLINIC | Age: 38
End: 2023-06-12
Payer: COMMERCIAL

## 2023-06-12 VITALS
HEART RATE: 71 BPM | RESPIRATION RATE: 14 BRPM | SYSTOLIC BLOOD PRESSURE: 132 MMHG | DIASTOLIC BLOOD PRESSURE: 83 MMHG | OXYGEN SATURATION: 99 % | TEMPERATURE: 98.4 F

## 2023-06-12 DIAGNOSIS — R52 PAIN: ICD-10-CM

## 2023-06-12 DIAGNOSIS — G97.1 POST-DURAL PUNCTURE HEADACHE: Primary | ICD-10-CM

## 2023-06-12 PROCEDURE — 25010000002 MIDAZOLAM PER 1 MG: Performed by: ANESTHESIOLOGY

## 2023-06-12 PROCEDURE — 25010000002 FENTANYL CITRATE (PF) 50 MCG/ML SOLUTION: Performed by: ANESTHESIOLOGY

## 2023-06-12 PROCEDURE — 77003 FLUOROGUIDE FOR SPINE INJECT: CPT

## 2023-06-12 RX ORDER — SODIUM CHLORIDE 9 MG/ML
40 INJECTION, SOLUTION INTRAVENOUS AS NEEDED
Status: DISCONTINUED | OUTPATIENT
Start: 2023-06-12 | End: 2023-06-13 | Stop reason: HOSPADM

## 2023-06-12 RX ORDER — SODIUM CHLORIDE 0.9 % (FLUSH) 0.9 %
10 SYRINGE (ML) INJECTION AS NEEDED
Status: DISCONTINUED | OUTPATIENT
Start: 2023-06-12 | End: 2023-06-13 | Stop reason: HOSPADM

## 2023-06-12 RX ORDER — LIDOCAINE HYDROCHLORIDE 10 MG/ML
1 INJECTION, SOLUTION INFILTRATION; PERINEURAL ONCE
Status: DISCONTINUED | OUTPATIENT
Start: 2023-06-12 | End: 2023-06-13 | Stop reason: HOSPADM

## 2023-06-12 RX ORDER — FENTANYL CITRATE 50 UG/ML
50 INJECTION, SOLUTION INTRAMUSCULAR; INTRAVENOUS ONCE
Status: COMPLETED | OUTPATIENT
Start: 2023-06-12 | End: 2023-06-12

## 2023-06-12 RX ORDER — SODIUM CHLORIDE 0.9 % (FLUSH) 0.9 %
10 SYRINGE (ML) INJECTION EVERY 12 HOURS SCHEDULED
Status: DISCONTINUED | OUTPATIENT
Start: 2023-06-12 | End: 2023-06-13 | Stop reason: HOSPADM

## 2023-06-12 RX ORDER — SODIUM CHLORIDE 0.9 % (FLUSH) 0.9 %
3 SYRINGE (ML) INJECTION EVERY 12 HOURS SCHEDULED
Status: DISCONTINUED | OUTPATIENT
Start: 2023-06-12 | End: 2023-06-13 | Stop reason: HOSPADM

## 2023-06-12 RX ORDER — MIDAZOLAM HYDROCHLORIDE 1 MG/ML
1 INJECTION INTRAMUSCULAR; INTRAVENOUS ONCE AS NEEDED
Status: COMPLETED | OUTPATIENT
Start: 2023-06-12 | End: 2023-06-12

## 2023-06-12 RX ADMIN — FENTANYL CITRATE 50 MCG: 50 INJECTION, SOLUTION INTRAMUSCULAR; INTRAVENOUS at 15:00

## 2023-06-12 RX ADMIN — MIDAZOLAM 1 MG: 1 INJECTION INTRAMUSCULAR; INTRAVENOUS at 15:01

## 2023-06-12 RX ADMIN — SODIUM CHLORIDE, POTASSIUM CHLORIDE, SODIUM LACTATE AND CALCIUM CHLORIDE 500 ML: 600; 310; 30; 20 INJECTION, SOLUTION INTRAVENOUS at 15:12

## 2023-06-12 NOTE — H&P
[facility]    History and Physical    Patient Name: Samuel Salmeron  :  1985  MRN:  [id number]  Date of Admission: 2023    Subjective     Patient is a 37 y.o. female presents with chief complaint of acute, severe headache pain.  Onset of symptoms was abrupt starting 2 days ago.  Symptoms are associated/aggravated by activity, sitting, or standing. Symptoms improve with lying down.  Had myelogram .  Severe headache started 6/10.  Was severe enough that she presented to the ED d/t pain & N/V.  Presents today for epidural blood patch.      The following portions of the patients history were reviewed and updated as appropriate: current medications, allergies, past medical history, past surgical history, past family history, past social history, and problem list                Objective     Past Medical History:   Past Medical History:   Diagnosis Date    Hypertension      Past Surgical History:   Past Surgical History:   Procedure Laterality Date     SECTION      CHOLECYSTECTOMY      CYST REMOVAL Right 2022    Under breast    KNEE SURGERY Left     Wood shard in bloodstream removed    SKIN CANCER EXCISION N/A     2022 back     Family History: History reviewed. No pertinent family history.  Social History:   Social History     Socioeconomic History    Marital status:    Tobacco Use    Smokeless tobacco: Never       Vital Signs Range for the last 24 hours  Temperature: Temp:  [36.9 °C (98.4 °F)] 36.9 °C (98.4 °F)   Temp Source:     BP: BP: (136)/(89) 136/89   Pulse: Heart Rate:  [78] 78   Respirations: Resp:  [16] 16   SPO2:     O2 Amount (l/min):     O2 Devices     Weight:           --------------------------------------------------------------------------------    Current Outpatient Medications   Medication Sig Dispense Refill    amLODIPine (NORVASC) 5 MG tablet       baclofen (LIORESAL) 20 MG tablet 1 tablet.      tiZANidine (ZANAFLEX) 4 MG tablet       traMADol (ULTRAM)  50 MG tablet       Norethin Ace-Eth Estrad-FE 1-20 MG-MCG(24) capsule Take 1 capsule by mouth Daily.      promethazine (PHENERGAN) 25 MG tablet        Current Facility-Administered Medications   Medication Dose Route Frequency Provider Last Rate Last Admin    sodium chloride 0.9 % flush 10 mL  10 mL Intravenous Q12H Alma Blue MD        sodium chloride 0.9 % flush 10 mL  10 mL Intravenous PRN Alma Blue MD        sodium chloride 0.9 % infusion 40 mL  40 mL Intravenous PRN Alma Blue MD           --------------------------------------------------------------------------------  Assessment & Plan      Anesthesia Evaluation     Patient summary reviewed and Nursing notes reviewed                Airway   Mallampati: II  Dental      Pulmonary - negative pulmonary ROS   Cardiovascular     (+) hypertension      Neuro/Psych- negative ROS  GI/Hepatic/Renal/Endo - negative ROS     Musculoskeletal (-) negative ROS    Abdominal    Substance History - negative use     OB/GYN negative ob/gyn ROS         Other                   Diagnosis and Plan    Treatment Plan  ASA 2      Procedures: Epidural blood patch, With fluoroscopy,      Anesthetic plan and risks discussed with patient.        Diagnosis     * Post-dural puncture headache [G97.1]

## 2023-06-12 NOTE — TELEPHONE ENCOUNTER
Pt had cervical myelogram 6/9/2023 and went to ED 6/10/2023. Pt states that she can't stand or move without terrible headache and neck stiffness. Pt did ask about blood patch while at ED but they said to reach out to neurosurgeon office. Contact Information  [phone]

## 2023-06-12 NOTE — ANESTHESIA PROCEDURE NOTES
Blood Patch    Pre-sedation assessment completed: 6/12/2023 2:48 PM    Patient reassessed immediately prior to procedure    Patient location during procedure: pain clinic  Blood patch placed: 6/12/2023 2:48 PM  Stop Time:6/12/2023 3:07 PM    Indications for Blood Patch: headache  Staff  Anesthesiologist: Alma Blue MD  Preanesthetic Checklist  Completed: patient identified, IV checked, site marked, risks and benefits discussed, surgical consent, monitors and equipment checked, pre-op evaluation and timeout performed  Blood Patch Prep  Patient position: prone  Sterile Tech: gloves, cap, mask and sterile barrier.  Prep: ChloraPrep  Patient monitoring: blood pressure monitoring, continuous pulse ox and EKG  Location: L4-L5  Blood Patch Procedure  Sedation:yes    Approach: midline   Guidance: fluoroscopy  Needle Gauge 22 G  Needle Type: Tuohy  Solution used: autologous blood  Loss of Resistance: 6 cm  Loss of Resistance Medium: saline  Blood Patch Source:venous    Amount injected: 20 mL  Assessment  Patient tolerance:patient tolerated the procedure well with no apparent complications  Complications:no  Additional Notes  Fluoroscopy used for epidural placement @ L4/5.  Sedation [id number]

## 2023-06-12 NOTE — TELEPHONE ENCOUNTER
Contacted Patient Dr. HUBER is sending her over for a blood patch to be done. I have also spoke up pain Management and they are calling her to get it set up today.

## 2023-06-15 NOTE — PROGRESS NOTES
"Subjective   Patient ID: Samuel Salmeron is a 37 y.o. female is here today for myelogram recovery    This patient has had right-sided lower chest wall pain consistent with intercostal neuralgia of unknown source.  The myelogram did not show any specific nerve root compression that would classify this as a thoracic radiculopathy.  Therefore no neurosurgical interventions of the decompressive nature are needed.  We talked about the possibility of spinal cord stimulation.  I do not think that is an unreasonable option but if she wants to go forward with a, she needs to speak with her pain physician about a spinal cord stimulator trial.  We went over the fact that this is a neural modulatory technique.  Gabapentin and Lyrica did not do much for her so this is reasonable to try since it has interfered with her quality of life.  She would like to think it over we will discuss moving forward with Dr. Cline if she feels that she would like to do.  I be happy to put a permanent device even if the trial is helpful.    History of Present Illness    The following portions of the patient's history were reviewed and updated as appropriate: allergies, current medications, past family history, past medical history, past social history, past surgical history, and problem list.    Review of Systems   Constitutional:  Negative for fever.   All other systems reviewed and are negative.        Objective     Vitals:    06/19/23 0900   BP: 132/74   BP Location: Left arm   Patient Position: Sitting   Cuff Size: Adult   Pulse: 79   Resp: 18   SpO2: 99%   Weight: 70.3 kg (155 lb)   Height: 167.6 cm (65.98\")   PainSc: 0-No pain     Body mass index is 25.03 kg/m².    Tobacco Use: Unknown    Smoking Tobacco Use: Never Assessed    Smokeless Tobacco Use: Never    Passive Exposure: Not on file          Physical Exam  Constitutional:       Appearance: She is well-developed.   HENT:      Head: Normocephalic and atraumatic.   Eyes:      " Extraocular Movements: EOM normal.      Conjunctiva/sclera: Conjunctivae normal.      Pupils: Pupils are equal, round, and reactive to light.   Neck:      Vascular: No carotid bruit.   Neurological:      Mental Status: She is oriented to person, place, and time.      Coordination: Finger-Nose-Finger Test and Heel to Shin Test normal.      Gait: Gait is intact.      Deep Tendon Reflexes:      Reflex Scores:       Tricep reflexes are 2+ on the right side and 2+ on the left side.       Bicep reflexes are 2+ on the right side and 2+ on the left side.       Brachioradialis reflexes are 2+ on the right side and 2+ on the left side.       Patellar reflexes are 2+ on the right side and 2+ on the left side.       Achilles reflexes are 2+ on the right side and 2+ on the left side.  Psychiatric:         Speech: Speech normal.     Neurologic Exam     Mental Status   Oriented to person, place, and time.   Registration of memory: Good recent and remote memory.   Attention: normal. Concentration: normal.   Speech: speech is normal   Level of consciousness: alert  Knowledge: consistent with education.     Cranial Nerves     CN II   Visual fields full to confrontation.   Visual acuity: normal    CN III, IV, VI   Pupils are equal, round, and reactive to light.  Extraocular motions are normal.     CN V   Facial sensation intact.   Right corneal reflex: normal  Left corneal reflex: normal    CN VII   Facial expression full, symmetric.   Right facial weakness: none  Left facial weakness: none    CN VIII   Hearing: intact    CN IX, X   Palate: symmetric    CN XI   Right sternocleidomastoid strength: normal  Left sternocleidomastoid strength: normal    CN XII   Tongue: not atrophic  Tongue deviation: none    Motor Exam   Muscle bulk: normal  Right arm tone: normal  Left arm tone: normal  Right leg tone: normal  Left leg tone: normal    Strength   Strength 5/5 except as noted.     Sensory Exam   Light touch normal.     Gait, Coordination,  and Reflexes     Gait  Gait: normal    Coordination   Finger to nose coordination: normal  Heel to shin coordination: normal    Reflexes   Right brachioradialis: 2+  Left brachioradialis: 2+  Right biceps: 2+  Left biceps: 2+  Right triceps: 2+  Left triceps: 2+  Right patellar: 2+  Left patellar: 2+  Right achilles: 2+  Left achilles: 2+  Right : 2+  Left : 2+        Assessment & Plan   Independent Review of Radiographic Studies:      I personally reviewed the images from the following studies.    I reviewed the CT myelogram done on 6/9/2303 of the thoracic and lumbar spine.  There is no right-sided root compression.  There is multiple levels of mild facet disease.  Agree with the report.    Medical Decision Making:      She will think about potentially undergoing a spinal cord stimulator trial.  If she decides she wants to do that she will contact Dr. Cline.  If it helps her, she will get in touch with me.  I will be more than happy to put the permanent device in if she and her pain physician so desire.    Diagnoses and all orders for this visit:    1. Thoracic radiculopathy (Primary)    2. DDD (degenerative disc disease), lumbar    3. Intercostal neuralgia      Return for The patient will let us know if she wants to consider SCS.

## 2023-06-19 ENCOUNTER — OFFICE VISIT (OUTPATIENT)
Dept: NEUROSURGERY | Facility: CLINIC | Age: 38
End: 2023-06-19
Payer: COMMERCIAL

## 2023-06-19 VITALS
WEIGHT: 155 LBS | OXYGEN SATURATION: 99 % | SYSTOLIC BLOOD PRESSURE: 132 MMHG | RESPIRATION RATE: 18 BRPM | HEIGHT: 66 IN | DIASTOLIC BLOOD PRESSURE: 74 MMHG | HEART RATE: 79 BPM | BODY MASS INDEX: 24.91 KG/M2

## 2023-06-19 DIAGNOSIS — G58.8 INTERCOSTAL NEURALGIA: ICD-10-CM

## 2023-06-19 DIAGNOSIS — M54.14 THORACIC RADICULOPATHY: Primary | ICD-10-CM

## 2023-06-19 DIAGNOSIS — M51.36 DDD (DEGENERATIVE DISC DISEASE), LUMBAR: ICD-10-CM

## 2023-06-19 PROCEDURE — 99214 OFFICE O/P EST MOD 30 MIN: CPT | Performed by: NEUROLOGICAL SURGERY

## 2023-06-19 NOTE — LETTER
June 19, 2023       No Recipients    Patient: Samuel Salmeron   YOB: 1985   Date of Visit: 6/19/2023       Dear Dr. Pritchard Recipients:    Thank you for referring Samuel Salmeron to me for evaluation. Below are the relevant portions of my assessment and plan of care.    If you have questions, please do not hesitate to call me. I look forward to following Samuel along with you.         Sincerely,        Marvin Marcelo MD        CC:   No Recipients    Marvin Marcelo MD  06/19/23 1017  Sign when Signing Visit  Subjective   Patient ID: Samuel Salmeron is a 37 y.o. female is here today for myelogram recovery    This patient has had right-sided lower chest wall pain consistent with intercostal neuralgia of unknown source.  The myelogram did not show any specific nerve root compression that would classify this as a thoracic radiculopathy.  Therefore no neurosurgical interventions of the decompressive nature are needed.  We talked about the possibility of spinal cord stimulation.  I do not think that is an unreasonable option but if she wants to go forward with a, she needs to speak with her pain physician about a spinal cord stimulator trial.  We went over the fact that this is a neural modulatory technique.  Gabapentin and Lyrica did not do much for her so this is reasonable to try since it has interfered with her quality of life.  She would like to think it over we will discuss moving forward with Dr. Cline if she feels that she would like to do.  I be happy to put a permanent device even if the trial is helpful.    History of Present Illness    The following portions of the patient's history were reviewed and updated as appropriate: allergies, current medications, past family history, past medical history, past social history, past surgical history, and problem list.    Review of Systems   Constitutional:  Negative for fever.   All other systems reviewed and are  "negative.        Objective     Vitals:    06/19/23 0900   BP: 132/74   BP Location: Left arm   Patient Position: Sitting   Cuff Size: Adult   Pulse: 79   Resp: 18   SpO2: 99%   Weight: 70.3 kg (155 lb)   Height: 167.6 cm (65.98\")   PainSc: 0-No pain     Body mass index is 25.03 kg/m².    Tobacco Use: Unknown   • Smoking Tobacco Use: Never Assessed   • Smokeless Tobacco Use: Never   • Passive Exposure: Not on file          Physical Exam  Constitutional:       Appearance: She is well-developed.   HENT:      Head: Normocephalic and atraumatic.   Eyes:      Extraocular Movements: EOM normal.      Conjunctiva/sclera: Conjunctivae normal.      Pupils: Pupils are equal, round, and reactive to light.   Neck:      Vascular: No carotid bruit.   Neurological:      Mental Status: She is oriented to person, place, and time.      Coordination: Finger-Nose-Finger Test and Heel to Shin Test normal.      Gait: Gait is intact.      Deep Tendon Reflexes:      Reflex Scores:       Tricep reflexes are 2+ on the right side and 2+ on the left side.       Bicep reflexes are 2+ on the right side and 2+ on the left side.       Brachioradialis reflexes are 2+ on the right side and 2+ on the left side.       Patellar reflexes are 2+ on the right side and 2+ on the left side.       Achilles reflexes are 2+ on the right side and 2+ on the left side.  Psychiatric:         Speech: Speech normal.     Neurologic Exam     Mental Status   Oriented to person, place, and time.   Registration of memory: Good recent and remote memory.   Attention: normal. Concentration: normal.   Speech: speech is normal   Level of consciousness: alert  Knowledge: consistent with education.     Cranial Nerves     CN II   Visual fields full to confrontation.   Visual acuity: normal    CN III, IV, VI   Pupils are equal, round, and reactive to light.  Extraocular motions are normal.     CN V   Facial sensation intact.   Right corneal reflex: normal  Left corneal reflex: " normal    CN VII   Facial expression full, symmetric.   Right facial weakness: none  Left facial weakness: none    CN VIII   Hearing: intact    CN IX, X   Palate: symmetric    CN XI   Right sternocleidomastoid strength: normal  Left sternocleidomastoid strength: normal    CN XII   Tongue: not atrophic  Tongue deviation: none    Motor Exam   Muscle bulk: normal  Right arm tone: normal  Left arm tone: normal  Right leg tone: normal  Left leg tone: normal    Strength   Strength 5/5 except as noted.     Sensory Exam   Light touch normal.     Gait, Coordination, and Reflexes     Gait  Gait: normal    Coordination   Finger to nose coordination: normal  Heel to shin coordination: normal    Reflexes   Right brachioradialis: 2+  Left brachioradialis: 2+  Right biceps: 2+  Left biceps: 2+  Right triceps: 2+  Left triceps: 2+  Right patellar: 2+  Left patellar: 2+  Right achilles: 2+  Left achilles: 2+  Right : 2+  Left : 2+        Assessment & Plan   Independent Review of Radiographic Studies:      I personally reviewed the images from the following studies.    I reviewed the CT myelogram done on 6/9/2303 of the thoracic and lumbar spine.  There is no right-sided root compression.  There is multiple levels of mild facet disease.  Agree with the report.    Medical Decision Making:      She will think about potentially undergoing a spinal cord stimulator trial.  If she decides she wants to do that she will contact Dr. Cline.  If it helps her, she will get in touch with me.  I will be more than happy to put the permanent device in if she and her pain physician so desire.    Diagnoses and all orders for this visit:    1. Thoracic radiculopathy (Primary)    2. DDD (degenerative disc disease), lumbar    3. Intercostal neuralgia      Return for The patient will let us know if she wants to consider SCS.